# Patient Record
Sex: FEMALE | Race: WHITE | NOT HISPANIC OR LATINO | Employment: OTHER | URBAN - METROPOLITAN AREA
[De-identification: names, ages, dates, MRNs, and addresses within clinical notes are randomized per-mention and may not be internally consistent; named-entity substitution may affect disease eponyms.]

---

## 2021-05-27 ENCOUNTER — RECORDS - HEALTHEAST (OUTPATIENT)
Dept: ADMINISTRATIVE | Facility: CLINIC | Age: 61
End: 2021-05-27

## 2021-05-28 ENCOUNTER — RECORDS - HEALTHEAST (OUTPATIENT)
Dept: ADMINISTRATIVE | Facility: CLINIC | Age: 61
End: 2021-05-28

## 2023-01-01 ENCOUNTER — PATIENT OUTREACH (OUTPATIENT)
Dept: ONCOLOGY | Facility: CLINIC | Age: 63
End: 2023-01-01
Payer: COMMERCIAL

## 2023-01-01 ENCOUNTER — TRANSCRIBE ORDERS (OUTPATIENT)
Dept: OTHER | Age: 63
End: 2023-01-01

## 2023-01-01 ENCOUNTER — PRE VISIT (OUTPATIENT)
Dept: ONCOLOGY | Facility: CLINIC | Age: 63
End: 2023-01-01
Payer: COMMERCIAL

## 2023-01-01 DIAGNOSIS — C18.9 PRIMARY MALIGNANT NEOPLASM OF COLON WITH METASTASIS IN FEMALE (H): Primary | ICD-10-CM

## 2023-04-13 NOTE — PROGRESS NOTES
Review of Oncology referral from  Med Onc Dr Perkins for pt with metastatic colon cancer.      Hx of colon ca on colonoscopy 6/18/21 at . Initially thought to be localized disease, completed hemicolectomy 8/6/2021. Biopsies of LNs and peritoneal nodules confirm metastatic disease. Pt started palli chemo w/ Dr Perkins by 9/2021, recent scans Jan 2023 & March 2023 show progressive disease. Regorafenib on hold. PS declining, consider another line of tx vs hospice vs clinical trial.  sending to [facility] for 2nd op/clinic trials.    Will offer next available DTC (~4/19/23 Dr Dick) or schedule per pt preference.      Carrington Key RN  Oncology Nurse Navigator  [facility]  [phone]

## 2023-04-17 NOTE — TELEPHONE ENCOUNTER
RECORDS STATUS - ALL OTHER DIAGNOSIS      Primary malignant neoplasm of colon with metastasis in female (H) [C18.9]    RECORDS RECEIVED FROM: [employer]   DATE RECEIVED:    NOTES STATUS DETAILS   OFFICE NOTE from referring provider 4.13.23 CE/[facility]'s Dr Alvaro Perkins   OFFICE NOTE from medical oncologist 4.3.23 CE/[facility]'s Dr Alvaro Perkins   DISCHARGE REPORT from the ER 12.27.22; 1.27.23    OPERATIVE REPORT     MEDICATION LIST NONE LISTED    LABS     PATHOLOGY REPORTS 6.18.21 - CE/HP's  Tracking #: [id number] Case: [id number]  Colon/Rectum   ANYTHING RELATED TO DIAGNOSIS 3.30.23 - CE/HP's Most recent labs   IMAGING (NEED IMAGES & REPORT) **ALL IN PACS**    CT SCANS [id number] HP's/CE    PET 2021 HP's/CE

## 2023-04-20 NOTE — TELEPHONE ENCOUNTER
Records received - sending back    April 20, 2023 3:48 PM  [id number]   Facility  [facility]  [address] [place] [address]  Phone: [phone]   Outcome Received path, patient canceled consult on 4/19. Consulted with RN, asked that path gets sent back to [facility].     [facility] to send slides back to [facility]:   Tracking #: [id number]